# Patient Record
Sex: MALE | Race: WHITE | NOT HISPANIC OR LATINO | Employment: UNEMPLOYED | URBAN - METROPOLITAN AREA
[De-identification: names, ages, dates, MRNs, and addresses within clinical notes are randomized per-mention and may not be internally consistent; named-entity substitution may affect disease eponyms.]

---

## 2018-08-09 ENCOUNTER — RECORDS - HEALTHEAST (OUTPATIENT)
Dept: LAB | Facility: CLINIC | Age: 28
End: 2018-08-09

## 2018-08-10 LAB — 25(OH)D3 SERPL-MCNC: 19.7 NG/ML (ref 30–80)

## 2019-03-05 ENCOUNTER — RECORDS - HEALTHEAST (OUTPATIENT)
Dept: LAB | Facility: CLINIC | Age: 29
End: 2019-03-05

## 2019-03-06 LAB — 25(OH)D3 SERPL-MCNC: 24.2 NG/ML (ref 30–80)

## 2021-05-07 ENCOUNTER — RECORDS - HEALTHEAST (OUTPATIENT)
Dept: LAB | Facility: CLINIC | Age: 31
End: 2021-05-07

## 2021-05-08 LAB — BACTERIA SPEC CULT: NO GROWTH

## 2024-04-04 ENCOUNTER — LAB REQUISITION (OUTPATIENT)
Dept: LAB | Facility: CLINIC | Age: 34
End: 2024-04-04

## 2024-04-04 DIAGNOSIS — Z13.1 ENCOUNTER FOR SCREENING FOR DIABETES MELLITUS: ICD-10-CM

## 2024-04-04 DIAGNOSIS — Z13.220 ENCOUNTER FOR SCREENING FOR LIPOID DISORDERS: ICD-10-CM

## 2024-04-04 LAB
CHOLEST SERPL-MCNC: 271 MG/DL
FASTING STATUS PATIENT QL REPORTED: ABNORMAL
FASTING STATUS PATIENT QL REPORTED: NO
GLUCOSE SERPL-MCNC: 101 MG/DL (ref 70–99)
HDLC SERPL-MCNC: 31 MG/DL
LDLC SERPL CALC-MCNC: ABNORMAL MG/DL
LDLC SERPL DIRECT ASSAY-MCNC: 142 MG/DL
NONHDLC SERPL-MCNC: 240 MG/DL
TRIGL SERPL-MCNC: 521 MG/DL

## 2024-04-04 PROCEDURE — 82947 ASSAY GLUCOSE BLOOD QUANT: CPT | Performed by: PHYSICIAN ASSISTANT

## 2024-04-04 PROCEDURE — 82465 ASSAY BLD/SERUM CHOLESTEROL: CPT | Performed by: PHYSICIAN ASSISTANT

## 2024-04-04 PROCEDURE — 83721 ASSAY OF BLOOD LIPOPROTEIN: CPT | Performed by: PHYSICIAN ASSISTANT

## 2024-11-10 ENCOUNTER — ANCILLARY PROCEDURE (OUTPATIENT)
Dept: GENERAL RADIOLOGY | Facility: CLINIC | Age: 34
End: 2024-11-10
Attending: INTERNAL MEDICINE
Payer: COMMERCIAL

## 2024-11-10 ENCOUNTER — OFFICE VISIT (OUTPATIENT)
Dept: URGENT CARE | Facility: URGENT CARE | Age: 34
End: 2024-11-10
Payer: COMMERCIAL

## 2024-11-10 VITALS
DIASTOLIC BLOOD PRESSURE: 91 MMHG | SYSTOLIC BLOOD PRESSURE: 141 MMHG | OXYGEN SATURATION: 96 % | RESPIRATION RATE: 18 BRPM | HEART RATE: 69 BPM | TEMPERATURE: 98.1 F

## 2024-11-10 DIAGNOSIS — R03.0 ELEVATED BP WITHOUT DIAGNOSIS OF HYPERTENSION: ICD-10-CM

## 2024-11-10 DIAGNOSIS — R06.2 WHEEZING: ICD-10-CM

## 2024-11-10 DIAGNOSIS — J18.9 PNEUMONIA OF RIGHT LOWER LOBE DUE TO INFECTIOUS ORGANISM: ICD-10-CM

## 2024-11-10 DIAGNOSIS — R55 SYNCOPE, UNSPECIFIED SYNCOPE TYPE: ICD-10-CM

## 2024-11-10 DIAGNOSIS — H66.002 NON-RECURRENT ACUTE SUPPURATIVE OTITIS MEDIA OF LEFT EAR WITHOUT SPONTANEOUS RUPTURE OF TYMPANIC MEMBRANE: ICD-10-CM

## 2024-11-10 DIAGNOSIS — J18.9 PNEUMONIA OF RIGHT LOWER LOBE DUE TO INFECTIOUS ORGANISM: Primary | ICD-10-CM

## 2024-11-10 PROCEDURE — 99204 OFFICE O/P NEW MOD 45 MIN: CPT | Performed by: INTERNAL MEDICINE

## 2024-11-10 PROCEDURE — 93000 ELECTROCARDIOGRAM COMPLETE: CPT | Performed by: INTERNAL MEDICINE

## 2024-11-10 PROCEDURE — 71046 X-RAY EXAM CHEST 2 VIEWS: CPT | Mod: TC | Performed by: RADIOLOGY

## 2024-11-10 RX ORDER — ALBUTEROL SULFATE 90 UG/1
2 INHALANT RESPIRATORY (INHALATION) EVERY 6 HOURS PRN
Qty: 18 G | Refills: 0 | Status: SHIPPED | OUTPATIENT
Start: 2024-11-10

## 2024-11-10 RX ORDER — PREDNISONE 20 MG/1
40 TABLET ORAL DAILY
Qty: 10 TABLET | Refills: 0 | Status: SHIPPED | OUTPATIENT
Start: 2024-11-10 | End: 2024-11-15

## 2024-11-10 ASSESSMENT — ENCOUNTER SYMPTOMS
NUMBNESS: 0
COUGH: 1
SHORTNESS OF BREATH: 0
WEAKNESS: 0
LIGHT-HEADEDNESS: 0
HEADACHES: 0
WHEEZING: 1
DIAPHORESIS: 0
PALPITATIONS: 0
FEVER: 0
CHILLS: 0
DIZZINESS: 0
CONFUSION: 0
PARESTHESIAS: 0
NAUSEA: 0
SEIZURES: 0

## 2024-11-10 NOTE — PATIENT INSTRUCTIONS
Chest x-ray-small white density right lower lobe.  I would suspect this is improved from your last chest x-ray  Radiology review pending if you would like the official report sign-on for MyChart    You still have ear infection.    At this point I will restart Augmentin twice daily for 10 days which will treat the ear infection and residual pneumonia.    You are very wheezy on exam.  Start prednisone 40 mg daily for 5 days.  Albuterol inhaler 2 puffs every 6 hours as needed.    EKG is normal.  There is no rhythm problem.  No heart attack changes.    From what you described you may have hyperventilated and fainted.    Please recheck with your primary clinic next week for repeat lung exam to make sure your symptoms are improving.  And follow-up from your fainting for this visit.

## 2024-11-10 NOTE — PROGRESS NOTES
ASSESSMENT AND PLAN:      ICD-10-CM    1. Pneumonia of right lower lobe due to infectious organism  J18.9 XR Chest 2 Views     amoxicillin-clavulanate (AUGMENTIN) 875-125 MG tablet      2. Elevated BP without diagnosis of hypertension  R03.0       3. Wheezing  R06.2 XR Chest 2 Views     albuterol (PROAIR HFA/PROVENTIL HFA/VENTOLIN HFA) 108 (90 Base) MCG/ACT inhaler     predniSONE (DELTASONE) 20 MG tablet      4. Non-recurrent acute suppurative otitis media of left ear without spontaneous rupture of tympanic membrane  H66.002 amoxicillin-clavulanate (AUGMENTIN) 875-125 MG tablet      5. Syncope, unspecified syncope type  R55 XR Chest 2 Views     EKG 12-lead complete w/read - Clinics      Adult:  None    Recent diagnosis of right pneumonia with left ear infection.  Short course antibiotic.  Here today with syncope and from history may have been related to hyperventilation during illness.  Currently wheezy on exam with ongoing left ear infection and there is a infiltrate noted on chest x-ray.  Concerned potentially that it was worse  Actively wheezing without history of asthma  Initiated on albuterol inhaler and prednisone    Recommend close follow-up with primary provider    To ER if symptoms are worse    PLAN:      Patient Instructions       Chest x-ray-small white density right lower lobe.  I would suspect this is improved from your last chest x-ray  Radiology review pending if you would like the official report sign-on for MyChart    You still have ear infection.    At this point I will restart Augmentin twice daily for 10 days which will treat the ear infection and residual pneumonia.    You are very wheezy on exam.  Start prednisone 40 mg daily for 5 days.  Albuterol inhaler 2 puffs every 6 hours as needed.    EKG is normal.  There is no rhythm problem.  No heart attack changes.    From what you described you may have hyperventilated and fainted.    Please recheck with your primary clinic next week for repeat  lung exam to make sure your symptoms are improving.  And follow-up from your fainting for this visit.      Sarah Lewis MD  [facility]    Subjective     Vernon Moya is a 34 year old who presents for Patient presents with:  Otalgia: Following up  Just finished antibiotics yet still has pain   Hypertension: Passed out today and thinks it was related to his high blood pressure  163/108 after incident   Cough: Recovering still  Ongoing cough that is getting better  No recent fever    a [facility] patient of [facility].    Recently seen at outside system .  treatment for pneumonia & ear infection. chest x-ray - spot on xray   treatment antibiotics Augmentin 3-day course, zpak 5-day course  Treatment with antibiotics although still present with residual cough    Today he passed out mid sentence.  Collapsed, put hand on chest.  Rolled eyes   LOC for 5-10 seconds    Taking fast little breaths to reduce tickling of throat.  May have hyperventilated    and he thinks it is related to his blood pressure  Blood pressure was taken after this occurred and it was 163/108          Review of Systems   Constitutional:  Negative for chills, diaphoresis and fever.        Resolved   Respiratory:  Positive for cough (wheezing cough) and wheezing. Negative for shortness of breath.    Cardiovascular:  Negative for chest pain and palpitations (few weeks ago at beginning pneumonia).   Gastrointestinal:  Negative for nausea.   Neurological:  Negative for dizziness, seizures, weakness, light-headedness, numbness, headaches and paresthesias.   Psychiatric/Behavioral:  Negative for confusion.            Objective    BP (!) 141/91   Pulse 69   Temp 98.1  F (36.7  C) (Temporal)   Resp 18   SpO2 96%   Physical Exam  Vitals reviewed.   Constitutional:       Appearance: Normal appearance. He is ill-appearing.   HENT:      Right Ear: Tympanic membrane normal.      Ears:      Comments: Left tympanic membrane purulent fluid      Nose: Nose normal.      Mouth/Throat:      Mouth: Mucous membranes are moist.      Pharynx: Oropharynx is clear.   Cardiovascular:      Rate and Rhythm: Normal rate and regular rhythm.      Pulses: Normal pulses.      Heart sounds: Normal heart sounds.   Pulmonary:      Effort: Pulmonary effort is normal. No respiratory distress.      Breath sounds: Wheezing (Wheezing throughout) present.   Neurological:      Mental Status: He is alert.            CXR - Reviewed and interpreted by me Infiltrate seen in the right lower lobe  EKG - Reviewed and interpreted by me appears normal, NSR, normal axis, normal intervals, no acute ST/T changes c/w ischemia, no LVH by voltage criteria, there are no prior tracings available

## 2025-07-07 ENCOUNTER — LAB REQUISITION (OUTPATIENT)
Dept: LAB | Facility: CLINIC | Age: 35
End: 2025-07-07

## 2025-07-07 DIAGNOSIS — L30.9 DERMATITIS, UNSPECIFIED: ICD-10-CM

## 2025-07-07 DIAGNOSIS — J02.9 ACUTE PHARYNGITIS, UNSPECIFIED: ICD-10-CM

## 2025-07-07 PROCEDURE — 87070 CULTURE OTHR SPECIMN AEROBIC: CPT | Performed by: FAMILY MEDICINE

## 2025-07-07 PROCEDURE — 83516 IMMUNOASSAY NONANTIBODY: CPT | Performed by: FAMILY MEDICINE

## 2025-07-09 LAB
GLIADIN IGA SER-ACNC: 0.9 U/ML
GLIADIN IGG SER-ACNC: <0.6 U/ML
IGA SERPL-MCNC: 201 MG/DL (ref 84–499)
TTG IGA SER-ACNC: 0.3 U/ML
TTG IGG SER-ACNC: <0.6 U/ML

## 2025-07-10 LAB
BACTERIA SPEC CULT: ABNORMAL

## 2025-08-20 ENCOUNTER — LAB REQUISITION (OUTPATIENT)
Dept: LAB | Facility: CLINIC | Age: 35
End: 2025-08-20

## 2025-08-20 DIAGNOSIS — R31.9 HEMATURIA, UNSPECIFIED: ICD-10-CM

## 2025-08-20 LAB
ALBUMIN UR-MCNC: NEGATIVE MG/DL
APPEARANCE UR: CLEAR
BILIRUB UR QL STRIP: NEGATIVE
COLOR UR AUTO: ABNORMAL
GLUCOSE UR STRIP-MCNC: NEGATIVE MG/DL
HGB UR QL STRIP: ABNORMAL
KETONES UR STRIP-MCNC: NEGATIVE MG/DL
LEUKOCYTE ESTERASE UR QL STRIP: NEGATIVE
MUCOUS THREADS #/AREA URNS LPF: PRESENT /LPF
NITRATE UR QL: NEGATIVE
PH UR STRIP: 5 [PH] (ref 5–7)
RBC URINE: <1 /HPF
SP GR UR STRIP: 1.02 (ref 1–1.03)
SQUAMOUS EPITHELIAL: <1 /HPF
UROBILINOGEN UR STRIP-MCNC: NORMAL MG/DL
WBC URINE: <1 /HPF

## 2025-08-20 PROCEDURE — 81003 URINALYSIS AUTO W/O SCOPE: CPT | Performed by: STUDENT IN AN ORGANIZED HEALTH CARE EDUCATION/TRAINING PROGRAM

## 2025-08-20 PROCEDURE — 87491 CHLMYD TRACH DNA AMP PROBE: CPT | Performed by: STUDENT IN AN ORGANIZED HEALTH CARE EDUCATION/TRAINING PROGRAM

## 2025-08-21 LAB
C TRACH DNA SPEC QL PROBE+SIG AMP: NEGATIVE
N GONORRHOEA DNA SPEC QL NAA+PROBE: NEGATIVE
SPECIMEN TYPE: NORMAL